# Patient Record
Sex: MALE | Race: WHITE | NOT HISPANIC OR LATINO | Employment: FULL TIME | URBAN - METROPOLITAN AREA
[De-identification: names, ages, dates, MRNs, and addresses within clinical notes are randomized per-mention and may not be internally consistent; named-entity substitution may affect disease eponyms.]

---

## 2018-03-22 ENCOUNTER — OFFICE VISIT (OUTPATIENT)
Dept: URGENT CARE | Facility: PHYSICIAN GROUP | Age: 53
End: 2018-03-22
Payer: COMMERCIAL

## 2018-03-22 VITALS
HEART RATE: 104 BPM | TEMPERATURE: 99.8 F | HEIGHT: 73 IN | DIASTOLIC BLOOD PRESSURE: 104 MMHG | BODY MASS INDEX: 31.14 KG/M2 | SYSTOLIC BLOOD PRESSURE: 150 MMHG | WEIGHT: 235 LBS | OXYGEN SATURATION: 100 %

## 2018-03-22 DIAGNOSIS — J02.9 PHARYNGITIS, UNSPECIFIED ETIOLOGY: ICD-10-CM

## 2018-03-22 DIAGNOSIS — J02.9 SORE THROAT: ICD-10-CM

## 2018-03-22 LAB
INT CON NEG: NEGATIVE
INT CON POS: POSITIVE
S PYO AG THROAT QL: NORMAL

## 2018-03-22 PROCEDURE — 87880 STREP A ASSAY W/OPTIC: CPT | Performed by: PHYSICIAN ASSISTANT

## 2018-03-22 PROCEDURE — 99214 OFFICE O/P EST MOD 30 MIN: CPT | Performed by: PHYSICIAN ASSISTANT

## 2018-03-22 RX ORDER — AZITHROMYCIN 250 MG/1
TABLET, FILM COATED ORAL
Qty: 6 TAB | Refills: 0 | Status: SHIPPED | OUTPATIENT
Start: 2018-03-22

## 2018-03-22 ASSESSMENT — PAIN SCALES - GENERAL: PAINLEVEL: 6=MODERATE PAIN

## 2018-03-22 ASSESSMENT — ENCOUNTER SYMPTOMS
PSYCHIATRIC NEGATIVE: 1
CHILLS: 1
GASTROINTESTINAL NEGATIVE: 1
MYALGIAS: 1
EYES NEGATIVE: 1
HEADACHES: 1
SORE THROAT: 1
FEVER: 1
COUGH: 1
CARDIOVASCULAR NEGATIVE: 1

## 2018-03-22 NOTE — PROGRESS NOTES
Subjective:      Dom Moncada is a 53 y.o. male who presents with Pharyngitis (worse sore throat than before, lethargy, feverish x 3 days, constant urination  )            HPI  Chief Complaint   Patient presents with   • Pharyngitis     worse sore throat than before, lethargy, feverish x 3 days, constant urination         HPI:  Dom Moncada is a 53 y.o. male who presents with sore throat.   Burning throat pain has been worsening over the last 3 days. Just returned from [place] and had multiple connecting flights.  Just got back from cruise.  No other sick contacts.  Work contacts possible sick.  No ear pain or runny nose.  Subj fever and chills.  Patient denies SOB, chest pain, palpitations, or n/v/d.    History reviewed. No pertinent past medical history.    History reviewed. No pertinent surgical history.    History reviewed. No pertinent family history.  No pertinent family history.    Social History     Social History   • Marital status: Single     Spouse name: N/A   • Number of children: N/A   • Years of education: N/A     Occupational History   • Not on file.     Social History Main Topics   • Smoking status: Never Smoker   • Smokeless tobacco: Never Used   • Alcohol use 1.8 oz/week     3 Cans of beer per week      Comment: occ   • Drug use: No   • Sexual activity: Not on file     Other Topics Concern   • Not on file     Social History Narrative   • No narrative on file         Current Outpatient Prescriptions:   •  diphenhydrAMINE, 25 mg, Oral, Q6HRS PRN  •  ibuprofen, 200 mg, Oral, Q6HRS PRN    No Known Allergies      Review of Systems   Constitutional: Positive for chills, fever and malaise/fatigue.   HENT: Positive for congestion and sore throat.    Eyes: Negative.    Respiratory: Positive for cough.    Cardiovascular: Negative.    Gastrointestinal: Negative.    Genitourinary: Negative.    Musculoskeletal: Positive for joint pain and myalgias.   Skin: Negative.    Neurological: Positive for  "headaches.   Endo/Heme/Allergies: Negative.    Psychiatric/Behavioral: Negative.           Objective:     /104   Pulse (!) 104   Temp 37.7 °C (99.8 °F)   Ht 1.854 m (6' 1\")   Wt 106.6 kg (235 lb)   SpO2 100%   BMI 31.00 kg/m²      Physical Exam       Nursing note and vitals reviewed.    Constitutional:   Appropriately groomed, pleasant affect, well nourished, in NAD.    Head:   Normocephalic, atraumatic.    Eyes:   PERRLA, EOM's full, sclera white, conjunctiva not erythematous, and medial canthus without exudate bilaterally.    Ears:  Auricle and tragus non-tender to manipulation.  No pre-auricular lymphadenopathy or mastoid ttp.  EACs with mild cerumen bilaterally, not erythematous.  TM’s mildly erythematous with cone of light present and umbo and malleolus visible bilaterally.  No bulging or fluid bubbles present in middle ear.  Hearing grossly intact to voice.    Nose:  Nares not patent bilaterally.  Nasal mucosa edematous clear rhinorrhea bilaterally. No sinus tenderness to percussion.    Throat:  Dentition wnl, mucosa moist without lesions.  Oropharynx erythematous, with no palatine tonsils bilaterally with no exudates.    Post nasal drainage present.  Soft palate rises symmetrically bilaterally and uvula midline.      Neck: Neck supple, with mild anterior lymphadenopathy that is soft and mobile to palpation. Thyroid non-palpable without tenderness or nodules. No supraclavicular lymphadenopathy.    Lungs:  Respiratory effort not labored without accessory muscle use.  Lungs with subtle wheezes bilaterally, no rales, and rhonchi that clear to cough.    Heart:  Tachycardic rate, RR, without murmurs rubs or gallops.  Radial and dorsalis pedis pulse 2+ bilaterally.  No LE edema.    Musculoskeletal:  Gait non-antalgic with a narrow base.    Derm:  Skin without rashes or lesions with good turgor pressure.      Psychiatric:  Mood, affect, and judgement appropriate.         Assessment/Plan:     1. Sore " throat  POCT Rapid Strep A    azithromycin (ZITHROMAX) 250 MG Tab   2. Pharyngitis, unspecified etiology  azithromycin (ZITHROMAX) 250 MG Tab      Patient presents with worsening sore throat with no other symptoms indicative of a viral process. He has had multiple travel lately to [place] and a cruise. On exam today patient has erythematous oropharynx but no tonsils bilaterally. Rapid strep negative. Did discuss throat culture patient deferred. Plan to treat with azithromycin given high suspicion for bacterial process. Reviewed symptoms support measures.    Patient was in agreement with this treatment plan and seemed to understand without barriers. All questions were encouraged and answered.  Reviewed signs and symptoms of when to seek emergency medical care.     Please note that this dictation was created using voice recognition software.  I have made every reasonable attempt to correct obvious errors, but I expect there are errors of gallo and possibly content that I did not discover before finalizing the note.

## 2018-03-22 NOTE — PATIENT INSTRUCTIONS
Flonase and nasal saline irrigation (netti pot or Madhu Med Sinus Rinse).  Used distilled water or boiled tap water with nasal flushes, not straight tap water.  Humidifier at bedtime.  Hot steam showers to loosen up mucous.  Cough medicine at bedtime (nyquil).  Delsym during the day.  Lots of fluids, tea with honey.  Ibuprofen for headache, fever, chills.  Be sure to take with food.  Salt water gargles.  Return if worsening: Yellow thicker mucus changes, worsening pain around the eyes and radiates to the teeth, and fever over 101°F.

## 2018-07-11 ENCOUNTER — OFFICE VISIT (OUTPATIENT)
Dept: URGENT CARE | Facility: PHYSICIAN GROUP | Age: 53
End: 2018-07-11
Payer: COMMERCIAL

## 2018-07-11 VITALS
SYSTOLIC BLOOD PRESSURE: 130 MMHG | HEIGHT: 73 IN | TEMPERATURE: 97.1 F | BODY MASS INDEX: 31.14 KG/M2 | OXYGEN SATURATION: 97 % | WEIGHT: 235 LBS | HEART RATE: 74 BPM | RESPIRATION RATE: 18 BRPM | DIASTOLIC BLOOD PRESSURE: 78 MMHG

## 2018-07-11 DIAGNOSIS — Z02.89 ENCOUNTER FOR EXAMINATION REQUIRED BY DEPARTMENT OF TRANSPORTATION (DOT): ICD-10-CM

## 2018-07-11 PROCEDURE — 7100 PR DOT PHYSICAL: Performed by: PHYSICIAN ASSISTANT

## 2018-07-12 NOTE — PROGRESS NOTES
Patient is a pleasant 53-year-old male who is presenting for a DOT examination.  Patient currently is not on any medications nor past medical history of hypertension, diabetes.  Patient with a normal examination to include left inguinal area as patient is status post hernia repair.  Patient cleared for 2 years please see scanned documentation.

## 2021-10-11 ENCOUNTER — APPOINTMENT (OUTPATIENT)
Dept: RADIOLOGY | Facility: MEDICAL CENTER | Age: 56
End: 2021-10-11
Attending: NURSE PRACTITIONER

## 2024-02-04 ENCOUNTER — OFFICE VISIT (OUTPATIENT)
Dept: URGENT CARE | Facility: PHYSICIAN GROUP | Age: 59
End: 2024-02-04
Payer: COMMERCIAL

## 2024-02-04 VITALS
SYSTOLIC BLOOD PRESSURE: 132 MMHG | RESPIRATION RATE: 18 BRPM | BODY MASS INDEX: 31.14 KG/M2 | TEMPERATURE: 98.3 F | HEIGHT: 73 IN | HEART RATE: 82 BPM | WEIGHT: 235 LBS | DIASTOLIC BLOOD PRESSURE: 86 MMHG | OXYGEN SATURATION: 98 %

## 2024-02-04 DIAGNOSIS — H01.001 BLEPHARITIS OF RIGHT UPPER EYELID, UNSPECIFIED TYPE: ICD-10-CM

## 2024-02-04 DIAGNOSIS — L25.9 CONTACT DERMATITIS OF SCALP: ICD-10-CM

## 2024-02-04 PROCEDURE — 99203 OFFICE O/P NEW LOW 30 MIN: CPT | Performed by: NURSE PRACTITIONER

## 2024-02-04 PROCEDURE — 3075F SYST BP GE 130 - 139MM HG: CPT | Performed by: NURSE PRACTITIONER

## 2024-02-04 PROCEDURE — 3079F DIAST BP 80-89 MM HG: CPT | Performed by: NURSE PRACTITIONER

## 2024-02-04 RX ORDER — OXYBUTYNIN CHLORIDE 10 MG/1
TABLET, EXTENDED RELEASE ORAL
COMMUNITY
Start: 2024-01-31

## 2024-02-04 RX ORDER — ATORVASTATIN CALCIUM 20 MG/1
TABLET, FILM COATED ORAL
COMMUNITY

## 2024-02-04 RX ORDER — PREDNISONE 20 MG/1
TABLET ORAL
Qty: 11 TABLET | Refills: 0 | Status: SHIPPED | OUTPATIENT
Start: 2024-02-04

## 2024-02-04 RX ORDER — ERYTHROMYCIN 5 MG/G
1 OINTMENT OPHTHALMIC EVERY 4 HOURS
Qty: 3.5 G | Refills: 0 | Status: SHIPPED | OUTPATIENT
Start: 2024-02-04 | End: 2024-02-11

## 2024-02-04 RX ORDER — TAMSULOSIN HYDROCHLORIDE 0.4 MG/1
CAPSULE ORAL
COMMUNITY
End: 2024-02-04

## 2024-02-04 RX ORDER — CEPHALEXIN 500 MG/1
500 CAPSULE ORAL 4 TIMES DAILY
Qty: 20 CAPSULE | Refills: 0 | Status: SHIPPED | OUTPATIENT
Start: 2024-02-04 | End: 2024-02-09

## 2024-02-04 ASSESSMENT — ENCOUNTER SYMPTOMS
EYE PAIN: 0
EYE ITCHING: 0
CONSTITUTIONAL NEGATIVE: 1
BLURRED VISION: 0
FEVER: 0
DOUBLE VISION: 0
EYE DISCHARGE: 1
EYE REDNESS: 1
CHILLS: 0

## 2024-02-04 ASSESSMENT — VISUAL ACUITY: OU: 1

## 2024-02-04 NOTE — PROGRESS NOTES
Subjective:     Dom Moncada is a 58 y.o. male who presents for Rash (Head x 5 days)       Rash  This is a new problem. The problem has been gradually worsening since onset. The affected locations include the head and scalp. Pertinent negatives include no eye pain or fever.   Eye Problem   The right eye is affected. This is a new problem. The problem has been gradually worsening. There was no injury mechanism. Associated symptoms include an eye discharge (Small, clear, bilateral) and eye redness. Pertinent negatives include no blurred vision, double vision, fever or itching.     5 days ago, patient noticed itchy bumps at the back of his head.  Developed surrounding itchy rash at the back of both sides of his head.  Has been scratching.    Today, started to notice small bumps with surrounding redness and swelling at his medial right upper eyelid.    Reports possible insect bite.  Reports he is a  and may have touched some vanilla flavoring with his hand and touched the back of his head.  Reports sharing the same truck with other people and has been resting the back of his head on the headrest.  Otherwise, no known allergens or exposure.    Review of Systems   Constitutional: Negative.  Negative for chills, fever and malaise/fatigue.   Eyes:  Positive for discharge (Small, clear, bilateral) and redness. Negative for blurred vision, double vision, pain and itching.        Right upper eyelid swelling, redness   Skin:  Positive for itching and rash.   All other systems reviewed and are negative.    Refer to HPI for additional details.    During this visit, appropriate PPE was worn, and hand hygiene was performed.    PMH:  has no past medical history on file.    MEDS:   Current Outpatient Medications:     atorvastatin (LIPITOR) 20 MG Tab, 20mg 1/day, Disp: , Rfl:     oxybutynin SR (DITROPAN-XL) 10 MG CR tablet, TAKE ONE TABLET BY MOUTH DAILY AS DIRECTED, Disp: , Rfl:     KETOCONAZOLE, TOPICAL, 1 %  "Shampoo, Apply 1 Application topically every day for 7 days., Disp: 125 mL, Rfl: 0    cephALEXin (KEFLEX) 500 MG Cap, Take 1 Capsule by mouth 4 times a day for 5 days., Disp: 20 Capsule, Rfl: 0    predniSONE (DELTASONE) 20 MG Tab, Take 3 tabs daily x 2 days, then 2 tabs daily x 2 days, then 1 tab on final day., Disp: 11 Tablet, Rfl: 0    erythromycin 5 MG/GM Ointment, Apply 1 cm to right eye every 4 hours for 7 days., Disp: 3.5 g, Rfl: 0    azithromycin (ZITHROMAX) 250 MG Tab, 2 tablets on day one, then 1 tablet PO daily until done., Disp: 6 Tab, Rfl: 0    ALLERGIES: No Known Allergies  SURGHX: History reviewed. No pertinent surgical history.  SOCHX:  reports that he has never smoked. He has never used smokeless tobacco. He reports current alcohol use of about 1.8 oz of alcohol per week. He reports that he does not use drugs.    FH: Per HPI as applicable/pertinent.      Objective:     /86 (BP Location: Left arm, Patient Position: Sitting, BP Cuff Size: Adult)   Pulse 82   Temp 36.8 °C (98.3 °F) (Temporal)   Resp 18   Ht 1.854 m (6' 1\")   Wt 107 kg (235 lb)   SpO2 98%   BMI 31.00 kg/m²     Physical Exam  Nursing note reviewed.   Constitutional:       General: He is not in acute distress.     Appearance: He is well-developed. He is not ill-appearing or toxic-appearing.   HENT:      Head: No right periorbital erythema or left periorbital erythema.        Comments: Diffuse erythema, inflammation of posterior scalp, no discharge or discrete lesions  Eyes:      General: Vision grossly intact.         Right eye: Discharge (Small, clear) present.         Left eye: Discharge (Small, clear) present.     Conjunctiva/sclera:      Right eye: Right conjunctiva is not injected. No chemosis.     Left eye: Left conjunctiva is not injected. No chemosis.     Pupils: Pupils are equal, round, and reactive to light.        Comments: Erythema, swelling of right upper eyelid   Cardiovascular:      Rate and Rhythm: Normal rate. "   Pulmonary:      Effort: Pulmonary effort is normal. No respiratory distress.   Musculoskeletal:         General: No deformity. Normal range of motion.   Lymphadenopathy:      Head:      Right side of head: Occipital adenopathy present.      Left side of head: Occipital adenopathy present.   Skin:     General: Skin is warm and dry.      Coloration: Skin is not pale.      Findings: Rash is not crusting, papular, pustular, scaling or vesicular.   Neurological:      Mental Status: He is alert and oriented to person, place, and time.      Motor: No weakness.   Psychiatric:         Behavior: Behavior normal. Behavior is cooperative.       Assessment/Plan:     1. Blepharitis of right upper eyelid, unspecified type  - erythromycin 5 MG/GM Ointment; Apply 1 cm to right eye every 4 hours for 7 days.  Dispense: 3.5 g; Refill: 0    2. Contact dermatitis of scalp  - KETOCONAZOLE, TOPICAL, 1 % Shampoo; Apply 1 Application topically every day for 7 days.  Dispense: 125 mL; Refill: 0  - cephALEXin (KEFLEX) 500 MG Cap; Take 1 Capsule by mouth 4 times a day for 5 days.  Dispense: 20 Capsule; Refill: 0  - predniSONE (DELTASONE) 20 MG Tab; Take 3 tabs daily x 2 days, then 2 tabs daily x 2 days, then 1 tab on final day.  Dispense: 11 Tablet; Refill: 0    Rx as above sent electronically. Start 24 hour antihistamine.    Identify/avoid possible allergens/irritants.    Monitor. Warning signs reviewed. Return precautions advised.     Differential diagnosis, natural history, supportive care, over-the-counter symptom management per 's instructions, close monitoring, and indications for immediate follow-up discussed.     All questions answered. Patient agrees with the plan of care.